# Patient Record
Sex: MALE | URBAN - METROPOLITAN AREA
[De-identification: names, ages, dates, MRNs, and addresses within clinical notes are randomized per-mention and may not be internally consistent; named-entity substitution may affect disease eponyms.]

---

## 2020-06-02 ENCOUNTER — OFFICE VISIT - HEALTHEAST (OUTPATIENT)
Dept: FAMILY MEDICINE | Facility: CLINIC | Age: 60
End: 2020-06-02

## 2020-06-02 DIAGNOSIS — C71.9 GLIOBLASTOMA MULTIFORME (H): ICD-10-CM

## 2020-06-02 DIAGNOSIS — R06.02 SHORTNESS OF BREATH: ICD-10-CM

## 2020-06-02 DIAGNOSIS — R60.0 LOWER EXTREMITY EDEMA: ICD-10-CM

## 2020-06-02 RX ORDER — DAPSONE 100 MG/1
100 TABLET ORAL DAILY
Status: SHIPPED | COMMUNITY
Start: 2020-02-26

## 2020-06-02 RX ORDER — ACETAMINOPHEN 500 MG
1000 TABLET ORAL EVERY 4 HOURS PRN
Status: SHIPPED | COMMUNITY
Start: 2019-10-16

## 2020-06-02 RX ORDER — AMLODIPINE BESYLATE 10 MG/1
10 TABLET ORAL DAILY
Status: SHIPPED | COMMUNITY
Start: 2019-09-15 | End: 2020-10-15

## 2020-06-05 ENCOUNTER — COMMUNICATION - HEALTHEAST (OUTPATIENT)
Dept: PHARMACY | Facility: CLINIC | Age: 60
End: 2020-06-05

## 2020-06-05 ENCOUNTER — AMBULATORY - HEALTHEAST (OUTPATIENT)
Dept: PHARMACY | Facility: CLINIC | Age: 60
End: 2020-06-05

## 2020-06-05 DIAGNOSIS — C71.9 GLIOBLASTOMA MULTIFORME (H): ICD-10-CM

## 2020-06-05 DIAGNOSIS — I26.99 PULMONARY EMBOLISM, BILATERAL (H): ICD-10-CM

## 2020-06-05 DIAGNOSIS — I10 ESSENTIAL HYPERTENSION: ICD-10-CM

## 2020-06-05 PROCEDURE — 99605 MTMS BY PHARM NP 15 MIN: CPT | Performed by: PHARMACIST

## 2020-06-10 ENCOUNTER — AMBULATORY - HEALTHEAST (OUTPATIENT)
Dept: LAB | Facility: CLINIC | Age: 60
End: 2020-06-10

## 2020-06-10 ENCOUNTER — OFFICE VISIT - HEALTHEAST (OUTPATIENT)
Dept: PHYSICAL THERAPY | Facility: REHABILITATION | Age: 60
End: 2020-06-10

## 2020-06-10 DIAGNOSIS — I26.99 OTHER ACUTE PULMONARY EMBOLISM, UNSPECIFIED WHETHER ACUTE COR PULMONALE PRESENT (H): ICD-10-CM

## 2020-06-10 DIAGNOSIS — M62.81 GENERALIZED MUSCLE WEAKNESS: ICD-10-CM

## 2020-06-10 DIAGNOSIS — R26.81 UNSTEADINESS ON FEET: ICD-10-CM

## 2020-06-10 DIAGNOSIS — I82.402 DEEP VEIN THROMBOSIS (DVT) OF LEFT LOWER EXTREMITY, UNSPECIFIED CHRONICITY, UNSPECIFIED VEIN (H): ICD-10-CM

## 2020-06-10 LAB
ANION GAP SERPL CALCULATED.3IONS-SCNC: 10 MMOL/L (ref 5–18)
BUN SERPL-MCNC: 17 MG/DL (ref 8–22)
CALCIUM SERPL-MCNC: 9.6 MG/DL (ref 8.5–10.5)
CHLORIDE BLD-SCNC: 107 MMOL/L (ref 98–107)
CO2 SERPL-SCNC: 23 MMOL/L (ref 22–31)
CREAT SERPL-MCNC: 1.22 MG/DL (ref 0.7–1.3)
ERYTHROCYTE [DISTWIDTH] IN BLOOD BY AUTOMATED COUNT: 12.6 % (ref 11–14.5)
GFR SERPL CREATININE-BSD FRML MDRD: >60 ML/MIN/1.73M2
GLUCOSE BLD-MCNC: 172 MG/DL (ref 70–125)
HCT VFR BLD AUTO: 36.6 % (ref 40–54)
HGB BLD-MCNC: 12.6 G/DL (ref 14–18)
MCH RBC QN AUTO: 33.9 PG (ref 27–34)
MCHC RBC AUTO-ENTMCNC: 34.3 G/DL (ref 32–36)
MCV RBC AUTO: 99 FL (ref 80–100)
PLATELET # BLD AUTO: 129 THOU/UL (ref 140–440)
PMV BLD AUTO: 8.1 FL (ref 7–10)
POTASSIUM BLD-SCNC: 3.4 MMOL/L (ref 3.5–5)
RBC # BLD AUTO: 3.71 MILL/UL (ref 4.4–6.2)
SODIUM SERPL-SCNC: 140 MMOL/L (ref 136–145)
WBC: 7.3 THOU/UL (ref 4–11)

## 2020-06-17 ENCOUNTER — OFFICE VISIT - HEALTHEAST (OUTPATIENT)
Dept: PHYSICAL THERAPY | Facility: REHABILITATION | Age: 60
End: 2020-06-17

## 2020-06-17 DIAGNOSIS — M62.81 GENERALIZED MUSCLE WEAKNESS: ICD-10-CM

## 2020-06-17 DIAGNOSIS — R26.81 UNSTEADINESS ON FEET: ICD-10-CM

## 2020-06-25 ENCOUNTER — OFFICE VISIT - HEALTHEAST (OUTPATIENT)
Dept: PHYSICAL THERAPY | Facility: REHABILITATION | Age: 60
End: 2020-06-25

## 2020-06-25 DIAGNOSIS — M62.81 GENERALIZED MUSCLE WEAKNESS: ICD-10-CM

## 2020-06-25 DIAGNOSIS — R26.81 UNSTEADINESS ON FEET: ICD-10-CM

## 2020-07-02 ENCOUNTER — OFFICE VISIT - HEALTHEAST (OUTPATIENT)
Dept: PHYSICAL THERAPY | Facility: REHABILITATION | Age: 60
End: 2020-07-02

## 2020-07-02 DIAGNOSIS — R26.81 UNSTEADINESS ON FEET: ICD-10-CM

## 2020-07-02 DIAGNOSIS — M62.81 GENERALIZED MUSCLE WEAKNESS: ICD-10-CM

## 2020-07-16 ENCOUNTER — OFFICE VISIT - HEALTHEAST (OUTPATIENT)
Dept: PHYSICAL THERAPY | Facility: REHABILITATION | Age: 60
End: 2020-07-16

## 2020-07-16 DIAGNOSIS — M62.81 GENERALIZED MUSCLE WEAKNESS: ICD-10-CM

## 2020-07-16 DIAGNOSIS — R26.81 UNSTEADINESS ON FEET: ICD-10-CM

## 2020-09-05 ENCOUNTER — COMMUNICATION - HEALTHEAST (OUTPATIENT)
Dept: SCHEDULING | Facility: CLINIC | Age: 60
End: 2020-09-05

## 2021-01-04 ENCOUNTER — HEALTH MAINTENANCE LETTER (OUTPATIENT)
Age: 61
End: 2021-01-04

## 2021-06-04 VITALS
OXYGEN SATURATION: 92 % | HEART RATE: 95 BPM | TEMPERATURE: 98.1 F | DIASTOLIC BLOOD PRESSURE: 88 MMHG | RESPIRATION RATE: 18 BRPM | WEIGHT: 253.1 LBS | SYSTOLIC BLOOD PRESSURE: 130 MMHG

## 2021-06-08 NOTE — PROGRESS NOTES
[facility]   Initial Evaluation    Patient Name: Elijah Wolff  Date of evaluation: 6/10/2020  PRECAUTIONS: GBM s/p resection 10/2019 and currently underdoing chemotherapy, HTN, h/o DVT and PE on Eloquis, h/o seizures  Referral Diagnosis:   I82.402 (ICD-10-CM) - Deep vein thrombosis (DVT) of left lower extremity, unspecified chronicity, unspecified vein (H)    I26.99 (ICD-10-CM) - Other acute pulmonary embolism, unspecified whether acute cor pulmonale present (H)      Referring provider: Kelsey Munguia MD  Visit Diagnosis:     ICD-10-CM    1. Generalized muscle weakness  M62.81    2. Unsteadiness on feet  R26.81        Assessment:      Pt. is appropriate for skilled PT intervention as outlined in the Plan of Care (POC).  Pt. is a good candidate for skilled PT services to improve pain levels and function.  Goals and POC established in collaboration with the patient.    Pt presents to PT s/p hospitalization 6/2-6/3 for recent DVT and PE, in the setting of GBM s/p resection 10/2019 and currently undergoing chemotherapy.  Examination reveals at least mild strength, balance impairments for patient's age, leading to slightly increased risk for future falls. Plan to progress LE strength and proprioceptive training as able to reduce risk for future falls.    Goals:  Pt. will be independent with home exercise program in : 6 weeks    Pt will: demonstrate improved balance: HUITRON >= 54/56 for decreased risk of future falls in 8 weeks.      Patient's expectations/goals are realistic.    Barriers to Learning or Achieving Goals:  GBM undergoing chemo  Appears to have relatively limited insight into deficits/limitations       Plan / Patient Instructions:        Plan of Care:   Communication with: Referral Source  Patient Related Instruction: Nature of Condition;Treatment plan and rationale;Self Care instruction;Basis of treatment;Precautions;Next steps;Expected outcome  Times per Week: 1-2  Number of Weeks:  "8  Number of Visits: 12  Therapeutic Exercise: Stretching;Strengthening  Neuromuscular Reeducation: balance/proprioception      Plan for next visit: Review HEP and follow-up on walking program, adding static balance challenge to HEP.  Complete FGA if time allows.     Subjective:       History of Present Illness:    Elijah is a 59 y.o. male who presents to therapy today with complaints of \"because my wife told me to.\"  Pt has an extensive history, including GBM resection in 10/2019, for which he is currently undergoing chemotherapy for (treated at [facility] in [place]).  Recently admitted to the hospital 6/2-6/3 for DVT and PE. With h/o such, currently on Eloquis.  Pt does not feel he needs any therapy. He reports he can do what he needs to do. He reports his wife will report that he falls, but he does not.  He does not feel strength, balance, or functional mobility to be a major issue for him. \"I do get winded, but it has been that way since anam high.\"    Pt seeks PT to \"unsure.\"     Objective:      Patient Outcome Measures :    Person Balance Scale Total (calculated): 52    Balance scores range from 0-56, where a score of 41-56 ='s a low fall risk (independent); 21-40 ='s a medium fall risk (ambulatory with assistance); 0-20 ='s a high fall risk (wheelchair).    Examination  1. Generalized muscle weakness     2. Unsteadiness on feet          Gait: Mildly unsteady with decreased bilat step length, with one mild LOB but able to self-correct    TUG: 10.8 sec without AD.  APTA 30\" sit<>stand: 14x without UE A  6m walk test: 4.7 sec without AD    Balance Assessment:  Rhomberg - Eyes Open:  30 seconds  Rhomberg - Eyes Closed:  30 seconds  Rhomberg - Perturbed Surface with Eyes Open:  30 seconds  Rhomberg - Perturbed Surface with Eyes Closed:  15 seconds      Treatment Today     TREATMENT MINUTES COMMENTS   Evaluation 40    Self-care/ Home management     Manual therapy     Neuromuscular Re-education     Therapeutic " Activity     Therapeutic Exercises 15 Exercises:  Exercise #1: Started walking program: encouraged 10 min, 1x/day  Comment #1: Sit<>stands: 10x - H  Exercise #2: Standing marches, hip ABD, knee curls, ankle PF/DF - h  Comment #2: demonstration/handouts provided  Exercise #3: semi-tandem - add next visit     Gait training     Modality__________________                Total 55    Blank areas are intentional and mean the treatment did not include these items.            PT Evaluation Code: (Please list factors)  Patient History/Comorbidities: GBM, appears to have limited insight  Examination: weakness/decreased balance/unsteady gait  Clinical Presentation: Evolving  Clinical Decision Making: Moderate    Patient History/  Comorbidities Examination  (body structures and functions, activity limitations, and/or participation restrictions) Clinical Presentation Clinical Decision Making (Complexity)   No documented Comorbidities or personal factors 1-2 Elements Stable and/or uncomplicated Low   1-2 documented comorbidities or personal factor 3 Elements Evolving clinical presentation with changing characteristics Moderate   3-4 documented comorbidities or personal factors 4 or more Unstable and unpredictable High           Sriram Paul  6/10/2020  6:44 AM

## 2021-06-08 NOTE — PROGRESS NOTES
MT Transition of Care Encounter  Assessment & Plan                                                     Post Discharge Medication Reconciliation Status: discharge medications reconciled, continue medications without change    PE/DVT: Symptoms are improving. Patient did not receive enough Eliquis. I will reach out to hospitalist and urgent care provider to see if they could order the remaining tablets.     glioblastoma multiform: Patient to continue to follow up with [facility].      Hypertension: Stable. BP ok inpatient, continue to monitor. Recommended to continue current regimen.     Follow Up  As needed with MT     Subjective & Objective                                                       Elijah Wolff is a 59 y.o. male called for a transitions of care visit. he was discharged from [facility] on 6/3/20 for PE and DVT. Of note, no pcp right now but once saw Dr blankenship at . Spoke to wife basilio on the wife (verbal consent)    Patient consented to a telehealth visit: Yes    Chief Complaint: breathing improved, some swelling but better than prior to admission.     Medication Adherence/Access: Did not receive enough Eliquis for 10 mg course. Received #13 tablets. Has 3 tablets left. Ernestos jasmin reached out to [facility] to get remaining medication they were going to waive the copay, but they have not heard back from [facility]. They paid $50 for the wrong amount of Eliquis and they dont want to pay that again due to the mistake. [employer] has the other order for #60 tabs, but wont fill yet until he completes the 10 mg course. She did speak with [facility] and they would like the rx to come from the hospitalist.     PE/DVT: Admitted from [facility]. Presented to ED complaining of left lower leg swelling and dyspnea.  He was diagnosed with left lower leg DVT subsegmental PE. Anticoagulation started with heparin infusion, after confirming with Dr. Hernan Cosme due to safety from intracranial hemorrhage standpoint.  Per  Dr. Cosme, recommended Eliquis 10 mg twice a day for 7 days, then 5 mg twice a day lifetime.  No clear data on side effects of intracranial hemorrhage, but appears to be the lowest in comparison with other anticoagulation regimen which is why they started Eliquis.  Daniela reports no issues with Eliquis, denies significant bleeding. Reports the pharmacist reviewed everything with her.     glioblastoma multiform: status post partial resection, currently undergoing chemotherapy, receiving all his cancer care by [facility].   Dexamethasone 2 mg one and half tablets (3 mg) daily, levetiracetam 1000 mg two times a day, and Dapsone 100 mg  daily. Chemo is Temodar 100 mg x4 (400 mg) x 5 days/23 off. Reports they are weaning him off dexamethasone and on Monday he will start 2 mg. Only uses Ondansetron 8 mg and prochlorperazine PRN when on chemo. Reports he used to be on Bactrim, but switched to Dapsone due to renal issues      Hypertension: Currently taking amlodipine  10 mg daily. Patient does not monitor BP at home, but it is checked at least once a month at [facility]. no lightheadedness/dizziness.       PMH: reviewed in EPIC   Allergies/ADRs: reviewed in EPIC   Alcohol: reviewed in EPIC  Tobacco:   Social History     Tobacco Use   Smoking Status Never Smoker   Smokeless Tobacco Never Used     Recent Vitals:   BP Readings from Last 3 Encounters:   06/03/20 143/89   06/02/20 130/88   09/08/19 120/69      Wt Readings from Last 3 Encounters:   06/03/20 (!) 254 lb 12.8 oz (115.6 kg)   06/02/20 (!) 253 lb 1.6 oz (114.8 kg)   09/08/19 210 lb (95.3 kg)     ----------------    The patient declined an after visit summary    I spent 15 minutes with this patient today;  . I offer these suggestions with the understanding that I don't fully understand Elijah's past medical history and the complexity of his health conditions.  Elijah should make no changes without the approval of his primary care provider.. A copy of the visit note was  provided to the patient's provider.     Moriah Soriano, PharmHannahD., BCACP  Medication Therapy Management Pharmacist  [facility]      Telemedicine Visit Details    Type of service:  Telephone     Start Time: 12:01  End Time (time video/phone call stopped): 12:!4    Originating Location (pt. Location): Home    Distant Location (provider location):  [facility] MEDICATION THERAPY MANAGEMENT [facility]    Mode of Communication:   Telephone     Current Outpatient Medications   Medication Sig Dispense Refill     acetaminophen (TYLENOL) 500 MG tablet Take 1,000 mg by mouth every 4 (four) hours as needed for pain or fever.        amLODIPine (NORVASC) 10 MG tablet Take 10 mg by mouth daily.        apixaban ANTICOAGULANT (ELIQUIS) 5 mg Tab tablet Take 1 tablet (5 mg total) by mouth 2 (two) times a day. 60 tablet 0     apixaban ANTICOAGULANT (ELIQUIS) 5 mg Tab tablet Take 2 tablets (10 mg total) by mouth 2 (two) times a day for 13 doses. 10 mg twice a day for 7 days, then continue with 5 mg twice a day, for lifetime. 13 tablet 0     dapsone 100 MG tablet Take 100 mg by mouth daily.        dexAMETHasone (DECADRON) 2 MG tablet Take 3 mg by mouth daily .       diphenhydrAMINE (BENADRYL) 25 mg tablet Take 50 mg by mouth at bedtime as needed for sleep.       levETIRAcetam (KEPPRA) 1000 MG tablet Take 1,000 mg by mouth 2 (two) times a day.        ondansetron (ZOFRAN) 8 MG tablet Take by mouth every 8 (eight) hours as needed for nausea. While taking temozolamide       prochlorperazine (COMPAZINE) 5 MG tablet Take 10 mg by mouth every 6 (six) hours as needed for nausea.        temozolomide (TEMODAR) 100 MG capsule Take 400 mg by mouth daily. Take daily for 5 days on an empty stomach       No current facility-administered medications for this visit.

## 2021-06-08 NOTE — PROGRESS NOTES
"  Assessment & Plan:       1. Shortness of breath     2. Glioblastoma multiforme (H)     3. Lower extremity edema        Medical Decision Making  Patient with history of glioblastoma multiforme, presents with acute onset lower extremity bilateral edema and shortness of breath.  Patient was initially told by his oncology team to present for an ultrasound of the lower extremities to rule out DVT, however they did not know about the shortness of breath.  On presentation patient appears short of breath and has an oxygen saturation of 92% on room air which is abnormal for him.  Given the concern for DVT rule out and patient's history of malignancy, he is at risk for a pulmonary embolism.  Recommend patient follow-up in the emergency room for PE, DVT, and cardiac work-up.  Called to [facility] emergency room and discussed patient case with provider and reason for transfer.  Patient and family agreed with plan.  Answered all questions.  Patient prefers to leave by private vehicle.      Subjective:       History provided by the patient and the wife.  Elijah Wolff is a 59 y.o. male with history of glioblastoma multiforme, here for evaluation of bilateral lower extremity swelling.  Patient was recently started on a new medication, Avastin, by his oncology team.  He then began developing swelling in the calf and ankles bilaterally yesterday after working on a garage and standing for long period of time.  He was then noted this morning is feeling more short of breath and feels \"feverish\", but does not have any active raised temperature.  The patient called her oncology team and told him about the swelling in the lower extremities, and they are instructed to follow-up in urgent care to get an ultrasound for DVT rule out.  However, they did not know about the shortness of breath at that time.    The following portions of the patient's history were reviewed and updated as appropriate: allergies, current medications and problem " list.    Review of Systems  Pertinent items are noted in HPI.     Allergies  Allergies   Allergen Reactions     Sulfamethoxazole-Trimethoprim Nephrotoxicity     Elevated creatinine and nephrology recommended discontinuing Bactrim       No family history on file.    Social History     Socioeconomic History     Marital status:      Spouse name: None     Number of children: None     Years of education: None     Highest education level: None   Occupational History     None   Social Needs     Financial resource strain: None     Food insecurity     Worry: None     Inability: None     Transportation needs     Medical: None     Non-medical: None   Tobacco Use     Smoking status: Never Smoker     Smokeless tobacco: Never Used   Substance and Sexual Activity     Alcohol use: None     Drug use: None     Sexual activity: None   Lifestyle     Physical activity     Days per week: None     Minutes per session: None     Stress: None   Relationships     Social connections     Talks on phone: None     Gets together: None     Attends Mosque service: None     Active member of club or organization: None     Attends meetings of clubs or organizations: None     Relationship status: None     Intimate partner violence     Fear of current or ex partner: None     Emotionally abused: None     Physically abused: None     Forced sexual activity: None   Other Topics Concern     None   Social History Narrative     None         Objective:       /88   Pulse 95   Temp 98.1  F (36.7  C) (Oral)   Resp 18   Wt (!) 253 lb 1.6 oz (114.8 kg)   SpO2 92%   General appearance: Appears diaphoretic, alert, appears stated age, cooperative, mild distress and non-toxic  Head: Presence of mesh netting and metal leads  Lungs: Poor lung sounds throughout with shallow breathing, no obvious rhonchi or rales, or wheezing  Heart: regular rate and rhythm, S1, S2 normal, no murmur, click, rub or gallop  Extremities: Mild to moderate bilateral lower  extremity pitting edema, no erythema, no increased warmth to touch, no tenderness to calf palpation

## 2021-06-08 NOTE — PROGRESS NOTES
[facility] Daily Progress     Patient Name: Elijah Wolff  Date: 6/17/2020  Visit #: 2  PTA visit #:    PRECAUTIONS: GBM s/p resection 10/2019 and currently underdoing chemotherapy, HTN, h/o DVT and PE on Eloquis, h/o seizures  Referral Diagnosis:   I82.402 (ICD-10-CM) - Deep vein thrombosis (DVT) of left lower extremity, unspecified chronicity, unspecified vein (H)    I26.99 (ICD-10-CM) - Other acute pulmonary embolism, unspecified whether acute cor pulmonale present (H)       Referring provider: eKlsey Munguia MD  Visit Diagnosis:     ICD-10-CM    1. Generalized muscle weakness  M62.81    2. Unsteadiness on feet  R26.81          Assessment:     Pt presents to PT s/p hospitalization 6/2-6/3 for recent DVT and PE, in the setting of GBM s/p resection 10/2019 and currently undergoing chemotherapy.  Examination reveals at least mild strength, balance impairments for patient's age, leading to slightly increased risk for future falls. Plan to progress LE strength and proprioceptive training as able to reduce risk for future falls.    HEP/POC compliance is  fair .  Patient is appropriate to continue with skilled physical therapy intervention, as indicated by initial plan of care.    Goal Status:  Pt. will be independent with home exercise program in : 6 weeks    Pt will: demonstrate improved balance: HUITRON >= 54/56 for decreased risk of future falls in 8 weeks.      Plan / Patient Education:     Next visit: Review HEP for correct performance.  Continue static/dynamic standing balance and LE strengthening. NuStep for LE strength and endurance.    Subjective:     Pain Rating: Did not rate  Reports going up steps is still difficult.  Unsure of how many times he has done the exercises; maybe about 4-5x. Notes fatigue after about 10 reps of each.  Went for one walk since last PT visit: made it about 0.8 mile. Reviewed and encouraged 10 minutes, 1x/day.    Objective:     Pt arrived 14 min late.  Reviewed HEP with  moderate cueing for correct performance. Emphasis on slow, controlled mvmts with as little support through UE as possible to help improve balance, along with LE strength.    Treatment Today     TREATMENT MINUTES COMMENTS   Evaluation     Self-care/ Home management     Manual therapy     Neuromuscular Re-education 18 Balance ex per flow sheet   Therapeutic Activity     Therapeutic Exercises     Gait training     Modality__________________                Total 18    Blank areas are intentional and mean the treatment did not include these items.       Sriram Paul  6/17/2020

## 2021-06-09 NOTE — PROGRESS NOTES
"[facility] Daily Progress     Patient Name: Elijah Wolff  Date: 6/25/2020  Visit #: 3  PTA visit #:    PRECAUTIONS: GBM s/p resection 10/2019 and currently underdoing chemotherapy, HTN, h/o DVT and PE on Eloquis, h/o seizures  Referral Diagnosis:   I82.402 (ICD-10-CM) - Deep vein thrombosis (DVT) of left lower extremity, unspecified chronicity, unspecified vein (H)    I26.99 (ICD-10-CM) - Other acute pulmonary embolism, unspecified whether acute cor pulmonale present (H)       Referring provider: Kelsey Munguia MD  Visit Diagnosis:     ICD-10-CM    1. Generalized muscle weakness  M62.81    2. Unsteadiness on feet  R26.81          Assessment:     Pt presents to PT s/p hospitalization 6/2-6/3 for recent DVT and PE, in the setting of GBM s/p resection 10/2019 and currently undergoing chemotherapy.  Examination reveals at least mild strength, balance impairments for patient's age, leading to slightly increased risk for future falls.     HEP/POC compliance is  fair .  Patient is appropriate to continue with skilled physical therapy intervention, as indicated by initial plan of care.    Goal Status:  Pt. will be independent with home exercise program in : 6 weeks    Pt will: demonstrate improved balance: HUITRON >= 54/56 for decreased risk of future falls in 8 weeks.      Plan / Patient Education:     Next visit: Review HEP for correct performance.  Continue static/dynamic standing balance and LE strengthening. NuStep for LE strength and endurance.    Subjective:     Pain Rating: Did not rate  No falls.  Having some temperature changes in the legs, \"which is annoying. They go hot and cold.\"  Soles of the feet get sore if walking a long distance, but has been working at 0.8 miles.    Objective:     Pt requires encouragement and cues to stay on task t/o session.  Several seated rest breaks required, primarily due to shortness of breath. Denies significant LE fatigue post session.    Treatment Today   "   TREATMENT MINUTES COMMENTS   Evaluation     Self-care/ Home management     Manual therapy     Neuromuscular Re-education 19 Balance ex per flow sheet   Therapeutic Activity     Therapeutic Exercises 8 Ex per flow sheet   Gait training     Modality__________________                Total 27    Blank areas are intentional and mean the treatment did not include these items.       Sriram Paul  6/25/2020

## 2021-06-09 NOTE — PROGRESS NOTES
[facility] Daily Progress     Patient Name: Elijah Wolff  Date: 7/2/2020  Visit #: 4  PTA visit #:    PRECAUTIONS: GBM s/p resection 10/2019 and currently underdoing chemotherapy, HTN, h/o DVT and PE on Eloquis, h/o seizures  Referral Diagnosis:   I82.402 (ICD-10-CM) - Deep vein thrombosis (DVT) of left lower extremity, unspecified chronicity, unspecified vein (H)    I26.99 (ICD-10-CM) - Other acute pulmonary embolism, unspecified whether acute cor pulmonale present (H)       Referring provider: Kelsey Munguia MD  Visit Diagnosis:     ICD-10-CM    1. Generalized muscle weakness  M62.81    2. Unsteadiness on feet  R26.81          Assessment:     Pt presents to PT s/p hospitalization 6/2-6/3 for recent DVT and PE, in the setting of GBM s/p resection 10/2019 and currently undergoing chemotherapy.  Examination reveals at least mild strength, balance impairments for patient's age, leading to slightly increased risk for future falls.     HEP/POC compliance is  fair .  Patient is appropriate to continue with skilled physical therapy intervention, as indicated by initial plan of care.    Goal Status:  Pt. will be independent with home exercise program in : 6 weeks    Pt will: demonstrate improved balance: HUITRON >= 54/56 for decreased risk of future falls in 8 weeks.      Plan / Patient Education:     Continue static/dynamic standing balance and LE strengthening. NuStep for LE strength and endurance.  Reassess HUITRON in 2 visits.    Subjective:     Pain Rating: Did not rate  No falls.     Objective:     Pt continues to require encouragement and cues to stay on task t/o session.  Several seated rest breaks required, primarily due to shortness of breath. Denies significant LE fatigue post session.    Exercises:  Exercise #1: Reviewed walking program: encouraged 10 min, 1x/day  Comment #1: Sit<>stands:  - H  Exercise #2: Standing marches, hip ABD, knee curls, ankle PF/DF - h  Comment #2: HEP  Exercise #3:  semi-tandem - H  Comment #3: HEP  Exercise #4: Sidestepping, high marches, and BW walking 2x30 ft each with SBA  Comment #4: Reciprocal stairs with single HR: 2x12  Exercise #5: NuStep level 3x6 min 49SPM avg  Comment #5: Resisted ambulation wtih 35# cart pull forwards, backwards    Treatment Today     TREATMENT MINUTES COMMENTS   Evaluation     Self-care/ Home management     Manual therapy     Neuromuscular Re-education     Therapeutic Activity     Therapeutic Exercises 26 Ex per flow sheet   Gait training     Modality__________________                Total 26    Blank areas are intentional and mean the treatment did not include these items.       Sriram Paul  7/2/2020

## 2021-06-09 NOTE — PROGRESS NOTES
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 Generalized muscle weakness  M62.81    2. Unsteadiness on feet  R26.81          Assessment:     Pt independent and compliant with HEP, with goals met. Reassessment of functional mobility tests reveals patient is at low risk fot future falls. Pt is appropriate to discharge to [facility] at this time.    Goal Status:  Pt. will be independent with home exercise program in : 6 weeks. MET  Pt will: demonstrate improved balance: HUITRON >= 54/56 for decreased risk of future falls in 8 weeks. MET    Plan / Patient Education:     Discharge to [facility] at this time.    Subjective:     Pain Rating: Did not rate  No falls. Had repeat MRI done at [facility] last week. \"It looks better.\"  Still undergoning chemo (5 days on, 23 days off) and [employer] eletrical current cap (14-18 hours/day).  Reports to be doing his HEP daily, along with 0.8 mile walks most days. No questions with HEP.    Objective:     TUG: 10 sec without AD (11 sec on eval)  APTA 30\" sit<>stand: 15x without UE A (14x on eval)  HUITRON/56 (52/56 on eval)  Verbally reviewed HEP, with encouragement to continue 1-2x/day and ambulation to tolerance as able.    Exercises:  Exercise #1: Reviewed walking program: encouraged 10 min, 1x/day  Comment #1: Sit<>stands:  - H  Exercise #2: Standing marches, hip ABD, knee curls, ankle PF/DF - h  Comment #2: HEP  Exercise #3: semi-tandem - H  Comment #3: HEP  Exercise #4: Sidestepping, " high marches, and BW walking 2x30 ft each with SBA  Comment #4: Reciprocal stairs with single HR: 2x12  Exercise #5: NuStep level 3x6 min 49SPM avg  Comment #5: Resisted ambulation wtih 35# cart pull forwards, backwards    Treatment Today     TREATMENT MINUTES COMMENTS   Evaluation     Self-care/ Home management     Manual therapy     Neuromuscular Re-education 8 Backwards walking, sidestepping, and high marches forwards 2x30 ft each with SBA for dynamic balance   Therapeutic Activity     Therapeutic Exercises     Gait training     Modality__________________     Physical Performance Test 25 Per above         Total 33    Blank areas are intentional and mean the treatment did not include these items.       Sriram Humberto  7/16/2020     [facility] Discharge Summary  Patient Name: Elijah Wolff  Date: 7/16/2020  Referral Diagnosis:   I82.402 (ICD-10-CM) - Deep vein thrombosis (DVT) of left lower extremity, unspecified chronicity, unspecified vein (H)    I26.99 (ICD-10-CM) - Other acute pulmonary embolism, unspecified whether acute cor pulmonale present (H)       Referring provider: Kelsey Munguia MD  Visit Diagnosis:   1. Generalized muscle weakness     2. Unsteadiness on feet         Goals:  Pt. will be independent with home exercise program in : 6 weeks. MET  Pt will: demonstrate improved balance: HUITRON >= 54/56 for decreased risk of future falls in 8 weeks. MET    Patient was seen for 5 visits from 6/10/20 to 716/20 with 1 missed appointments.  The patient met goals and has demonstrated understanding of and independence in the home program for self-care, and progression to next steps.  He will initiate contact if questions or concerns arise.  No further therapy is required at this time.    Therapy will be discontinued at this time.  The patient will need a new referral to resume.    Thank you for your referral.  Sriram Paul  7/16/2020  11:30 AM

## 2021-06-16 PROBLEM — K76.0 NAFLD (NONALCOHOLIC FATTY LIVER DISEASE): Status: ACTIVE | Noted: 2020-07-11

## 2021-06-16 PROBLEM — N18.9 CKD (CHRONIC KIDNEY DISEASE): Status: ACTIVE | Noted: 2019-09-08

## 2021-06-16 PROBLEM — C71.9 GLIOBLASTOMA MULTIFORME (H): Status: ACTIVE | Noted: 2019-10-14

## 2021-06-16 PROBLEM — I82.4Y9 ACUTE DEEP VEIN THROMBOSIS (DVT) OF PROXIMAL VEIN OF LOWER EXTREMITY (H): Status: ACTIVE | Noted: 2020-06-02

## 2021-06-16 PROBLEM — R73.9 HYPERGLYCEMIA: Status: ACTIVE | Noted: 2020-09-04

## 2021-06-16 PROBLEM — H93.13 TINNITUS OF BOTH EARS: Status: ACTIVE | Noted: 2017-11-03

## 2021-06-16 PROBLEM — I26.99 PULMONARY EMBOLISM, BILATERAL (H): Status: ACTIVE | Noted: 2020-06-02

## 2021-06-16 PROBLEM — G54.0 BRACHIAL PLEXUS NEUROPATHY: Status: ACTIVE | Noted: 2019-06-10

## 2021-06-16 PROBLEM — G93.6 CEREBRAL EDEMA (H): Status: ACTIVE | Noted: 2019-09-08

## 2021-06-16 PROBLEM — E66.9 OBESITY WITH BODY MASS INDEX 30 OR GREATER: Status: ACTIVE | Noted: 2019-09-25

## 2021-06-16 PROBLEM — I10 HYPERTENSION: Status: ACTIVE | Noted: 2019-09-08

## 2021-06-16 PROBLEM — N17.9 AKI (ACUTE KIDNEY INJURY) (H): Status: ACTIVE | Noted: 2019-09-08

## 2021-06-16 PROBLEM — R56.9 SEIZURE (H): Status: ACTIVE | Noted: 2019-09-08

## 2021-06-18 NOTE — PATIENT INSTRUCTIONS - HE
Patient Instructions by Sriram Paul PT at 6/17/2020 10:30 AM     Author: Sriram Paul PT Service: -- Author Type: Physical Therapist    Filed: 6/17/2020 11:00 AM Encounter Date: 6/17/2020 Status: Signed    : Sriram Paul PT (Physical Therapist)          PARTIAL HEEL-TOE STANCE    Stand with your right foot partially in front of the other.  Look straight ahead.    Repeat with left foot in front.    Hold 30 seconds, 2x each foot.  2x/day.

## 2021-06-18 NOTE — PATIENT INSTRUCTIONS - HE
Patient Instructions by Sriram Paul PT at 6/10/2020  8:00 AM     Author: Sriram Paul PT Service: -- Author Type: Physical Therapist    Filed: 6/10/2020  8:53 AM Encounter Date: 6/10/2020 Status: Signed    : Sriram Paul PT (Physical Therapist)       Start with 10 minute walk daily, aiming for 5x/week.     SIT TO STAND    While making sure you bend at the hip, SLOWLY sit down    Your chest may come forward over your toes, but your spine should stay in neutral as shown.    10-15x.  1x/day.          STANDING MARCHING    While standing, draw up your knee, set it down and then alternate to your other side.    Use your arms for support if needed for balance and safety.     10-15x.  1x/day.    HIP ABDUCTION - STANDING     While standing, raise your leg out to the side. Keep your knee straight and maintain your toes pointed forward the entire time.      Use your arms for support if needed for balance and safety.     10-15x.  1x/day.    STANDING HAMSTRING CURLS    While standing, bend your knee so that your heel moves towards your buttock. Lower back down until first contact with floor and repeat.   Keep knees in-line with one another.    10-15x.  1x/day.      STANDING HEEL RAISES    While standing, raise up on your toes as you lift your heels off the ground,  Then go back on your heels and lift your toes off the ground.    10-15x.  1x/day.

## 2021-07-14 PROBLEM — J96.00 ACUTE RESPIRATORY FAILURE (H): Status: RESOLVED | Noted: 2019-09-08 | Resolved: 2020-09-04

## 2021-10-10 ENCOUNTER — HEALTH MAINTENANCE LETTER (OUTPATIENT)
Age: 61
End: 2021-10-10

## 2022-01-30 ENCOUNTER — HEALTH MAINTENANCE LETTER (OUTPATIENT)
Age: 62
End: 2022-01-30

## 2022-09-24 ENCOUNTER — HEALTH MAINTENANCE LETTER (OUTPATIENT)
Age: 62
End: 2022-09-24

## 2023-05-08 ENCOUNTER — HEALTH MAINTENANCE LETTER (OUTPATIENT)
Age: 63
End: 2023-05-08
